# Patient Record
Sex: FEMALE | Race: WHITE | NOT HISPANIC OR LATINO | Employment: FULL TIME | URBAN - NONMETROPOLITAN AREA
[De-identification: names, ages, dates, MRNs, and addresses within clinical notes are randomized per-mention and may not be internally consistent; named-entity substitution may affect disease eponyms.]

---

## 2022-12-01 ENCOUNTER — HOSPITAL ENCOUNTER (EMERGENCY)
Facility: HOSPITAL | Age: 47
Discharge: HOME | End: 2022-12-02
Attending: EMERGENCY MEDICINE

## 2022-12-01 VITALS
TEMPERATURE: 98.7 F | DIASTOLIC BLOOD PRESSURE: 84 MMHG | RESPIRATION RATE: 20 BRPM | OXYGEN SATURATION: 96 % | WEIGHT: 210.98 LBS | HEIGHT: 62 IN | HEART RATE: 108 BPM | BODY MASS INDEX: 38.83 KG/M2 | SYSTOLIC BLOOD PRESSURE: 131 MMHG

## 2022-12-01 DIAGNOSIS — U07.1 COVID: Primary | ICD-10-CM

## 2022-12-01 NOTE — Clinical Note
Shane Stevenson was seen and treated in our emergency department on 12/1/2022     ?    ?    ? Diagnosis: ?    Ayleen Macario  may return to work on return date  She may return on this date: 12/06/2022    ? If you have any questions or concerns, please don't hesitate to call        Hannah Marquez MD    ______________________________           _______________          _______________  Hospital Representative                              Date                                Time

## 2022-12-02 ENCOUNTER — APPOINTMENT (EMERGENCY)
Dept: CT IMAGING | Facility: HOSPITAL | Age: 47
End: 2022-12-02

## 2022-12-02 ENCOUNTER — APPOINTMENT (EMERGENCY)
Dept: RADIOLOGY | Facility: HOSPITAL | Age: 47
End: 2022-12-02

## 2022-12-02 LAB
ANION GAP SERPL CALCULATED.3IONS-SCNC: 10 MMOL/L (ref 4–13)
ATRIAL RATE: 104 BPM
BASOPHILS # BLD AUTO: 0.03 THOUSANDS/ÂΜL (ref 0–0.1)
BASOPHILS NFR BLD AUTO: 0 % (ref 0–1)
BUN SERPL-MCNC: 12 MG/DL (ref 5–25)
CALCIUM SERPL-MCNC: 8.6 MG/DL (ref 8.3–10.1)
CARDIAC TROPONIN I PNL SERPL HS: 2 NG/L
CHLORIDE SERPL-SCNC: 101 MMOL/L (ref 96–108)
CO2 SERPL-SCNC: 27 MMOL/L (ref 21–32)
CREAT SERPL-MCNC: 0.75 MG/DL (ref 0.6–1.3)
D DIMER PPP FEU-MCNC: 0.69 UG/ML FEU
EOSINOPHIL # BLD AUTO: 0.04 THOUSAND/ÂΜL (ref 0–0.61)
EOSINOPHIL NFR BLD AUTO: 1 % (ref 0–6)
ERYTHROCYTE [DISTWIDTH] IN BLOOD BY AUTOMATED COUNT: 14.7 % (ref 11.6–15.1)
FLUAV RNA RESP QL NAA+PROBE: NEGATIVE
FLUBV RNA RESP QL NAA+PROBE: NEGATIVE
GFR SERPL CREATININE-BSD FRML MDRD: 94 ML/MIN/1.73SQ M
GLUCOSE SERPL-MCNC: 127 MG/DL (ref 65–140)
HCT VFR BLD AUTO: 38.6 % (ref 34.8–46.1)
HGB BLD-MCNC: 12.5 G/DL (ref 11.5–15.4)
IMM GRANULOCYTES # BLD AUTO: 0.02 THOUSAND/UL (ref 0–0.2)
IMM GRANULOCYTES NFR BLD AUTO: 0 % (ref 0–2)
LYMPHOCYTES # BLD AUTO: 1.34 THOUSANDS/ÂΜL (ref 0.6–4.47)
LYMPHOCYTES NFR BLD AUTO: 17 % (ref 14–44)
MCH RBC QN AUTO: 27.2 PG (ref 26.8–34.3)
MCHC RBC AUTO-ENTMCNC: 32.4 G/DL (ref 31.4–37.4)
MCV RBC AUTO: 84 FL (ref 82–98)
MONOCYTES # BLD AUTO: 1.09 THOUSAND/ÂΜL (ref 0.17–1.22)
MONOCYTES NFR BLD AUTO: 14 % (ref 4–12)
NEUTROPHILS # BLD AUTO: 5.43 THOUSANDS/ÂΜL (ref 1.85–7.62)
NEUTS SEG NFR BLD AUTO: 68 % (ref 43–75)
NRBC BLD AUTO-RTO: 0 /100 WBCS
P AXIS: 66 DEGREES
PLATELET # BLD AUTO: 242 THOUSANDS/UL (ref 149–390)
PMV BLD AUTO: 9.4 FL (ref 8.9–12.7)
POTASSIUM SERPL-SCNC: 3.3 MMOL/L (ref 3.5–5.3)
PR INTERVAL: 158 MS
QRS AXIS: -9 DEGREES
QRSD INTERVAL: 76 MS
QT INTERVAL: 314 MS
QTC INTERVAL: 412 MS
RBC # BLD AUTO: 4.6 MILLION/UL (ref 3.81–5.12)
RSV RNA RESP QL NAA+PROBE: NEGATIVE
SARS-COV-2 RNA RESP QL NAA+PROBE: POSITIVE
SODIUM SERPL-SCNC: 138 MMOL/L (ref 135–147)
T WAVE AXIS: 7 DEGREES
VENTRICULAR RATE: 104 BPM
WBC # BLD AUTO: 7.95 THOUSAND/UL (ref 4.31–10.16)

## 2022-12-02 RX ORDER — SODIUM CHLORIDE 9 MG/ML
3 INJECTION INTRAVENOUS
Status: DISCONTINUED | OUTPATIENT
Start: 2022-12-02 | End: 2022-12-02 | Stop reason: HOSPADM

## 2022-12-02 RX ORDER — POTASSIUM CHLORIDE 20 MEQ/1
20 TABLET, EXTENDED RELEASE ORAL ONCE
Status: COMPLETED | OUTPATIENT
Start: 2022-12-02 | End: 2022-12-02

## 2022-12-02 RX ORDER — OMEPRAZOLE 40 MG/1
CAPSULE, DELAYED RELEASE ORAL
COMMUNITY
Start: 2022-10-20

## 2022-12-02 RX ORDER — AMLODIPINE BESYLATE 5 MG/1
1 TABLET ORAL DAILY
COMMUNITY

## 2022-12-02 RX ADMIN — IOHEXOL 100 ML: 350 INJECTION, SOLUTION INTRAVENOUS at 03:11

## 2022-12-02 RX ADMIN — POTASSIUM CHLORIDE 20 MEQ: 1500 TABLET, EXTENDED RELEASE ORAL at 01:43

## 2022-12-02 NOTE — DISCHARGE INSTRUCTIONS
Because your COVID-19 test is positive, the best recommendation is to isolate for 5 days from others from the onset of symptoms  Isolation separates sick people with a contagious disease from people who are not sick  Quarantine separates and restricts the movement of people who were exposed to a contagious disease to see if they become sick  -------------------------------------------------------------  IF your symptoms are improving,  You may end isolation after day 5 if:  You are fever-free for 24 hours (without the use of fever-reducing medication)  IF your symptoms are not improving,  Continue to isolate until:  You are fever-free for 24 hours (without the use of fever-reducing medication)  Your symptoms are improving   -------------------------------------------------------------  IF you had symptoms and had: Moderate illness (you experienced shortness of breath or had difficulty breathing)  - You need to isolate through day 10  Severe illness (you were hospitalized) or have a weakened immune system  You need to isolate through day 10  Consult your doctor before ending isolation  Ending isolation without a viral test may not be an option for you   --------------------------------------------------------------    Regardless of when you end isolation    Until at least day 11:  Avoid being around people who are more likely to get very sick from COVID-19  Remember to wear a high-quality mask when indoors around others at home and in public  Do not go places where you are unable to wear a mask until you are able to discontinue masking (see below)  After you have ended isolation, when you are feeling better (no fever without the use of fever-reducing medications and symptoms improving),  Wear your mask through day 10  OR  If you have access to antigen tests, you should consider using them   With two sequential negative tests 48 hours apart, you may remove your mask sooner than day How Severe Is Your Skin Lesion?: mild 10       [employer] gl    [email or web address] [facility]  org/    COVID 23 Hotlines:  [address]    [address] [facility]    Padmini Edwar [phone] Option #7 Have Your Skin Lesions Been Treated?: not been treated Is This A New Presentation, Or A Follow-Up?: Skin Lesions

## 2022-12-02 NOTE — ED PROVIDER NOTES
History  Chief Complaint   Patient presents with   • Rapid Heart Rate     C/o congestion x few days  Pt states tonight she got up to go to the bathroom and felt like her heart was beating fast       HPI  46F presenting with congestion and elevated HR  For the past few days, patient has been feeling congestion, rhinorrhea, sore throat, cough  As she went to the bathroom, she felt her heart racing  +tightness in center of chest  Decreased po intake  Reported temp of 100 3 at home  Took Tylenol and mucinex  No recent sick contacts  Denies shortness of breath  Vaccinated for COVID, no boosters  Denies smoking, or drug use  Prior to Admission Medications   Prescriptions Last Dose Informant Patient Reported? Taking? amLODIPine (NORVASC) 5 mg tablet   Yes No   Sig: Take 1 tablet by mouth daily   omeprazole (PriLOSEC) 40 MG capsule   Yes Yes      Facility-Administered Medications: None       Past Medical History:   Diagnosis Date   • GERD (gastroesophageal reflux disease)    • Hypertension        Past Surgical History:   Procedure Laterality Date   • TONSILLECTOMY         History reviewed  No pertinent family history  I have reviewed and agree with the history as documented  E-Cigarette/Vaping   • E-Cigarette Use Never User      E-Cigarette/Vaping Substances     Social History     Tobacco Use   • Smoking status: Never   • Smokeless tobacco: Never   Vaping Use   • Vaping Use: Never used   Substance Use Topics   • Alcohol use: Not Currently   • Drug use: Never       Review of Systems   Constitutional: Negative for chills and fever  HENT: Positive for congestion and rhinorrhea  Negative for ear pain and sore throat  Eyes: Negative for pain and visual disturbance  Respiratory: Positive for cough and chest tightness  Negative for shortness of breath  Cardiovascular: Positive for chest pain and palpitations  Gastrointestinal: Negative for abdominal pain and vomiting     Genitourinary: Negative for dysuria and hematuria  Musculoskeletal: Negative for arthralgias and back pain  Skin: Negative for color change and rash  Neurological: Negative for seizures and syncope  All other systems reviewed and are negative  Physical Exam  Physical Exam  Vitals and nursing note reviewed  Constitutional:       General: She is not in acute distress  Appearance: She is well-developed  HENT:      Head: Normocephalic and atraumatic  Right Ear: External ear normal       Left Ear: External ear normal       Nose: Nose normal    Eyes:      Conjunctiva/sclera: Conjunctivae normal    Cardiovascular:      Rate and Rhythm: Normal rate and regular rhythm  Pulmonary:      Effort: Pulmonary effort is normal  No respiratory distress  Breath sounds: Normal breath sounds  Abdominal:      Palpations: Abdomen is soft  Tenderness: There is no abdominal tenderness  Musculoskeletal:         General: Tenderness present  Cervical back: Normal range of motion and neck supple  Comments: Tenderness to palpation over left chest wall  Chest tightness reproducible  Skin:     General: Skin is warm and dry  Neurological:      General: No focal deficit present  Mental Status: She is alert  Mental status is at baseline           Vital Signs  ED Triage Vitals [12/01/22 2357]   Temperature Pulse Respirations Blood Pressure SpO2   98 7 °F (37 1 °C) (!) 108 20 131/84 96 %      Temp Source Heart Rate Source Patient Position - Orthostatic VS BP Location FiO2 (%)   Temporal Monitor Sitting Right arm --      Pain Score       No Pain           Vitals:    12/01/22 2357   BP: 131/84   Pulse: (!) 108   Patient Position - Orthostatic VS: Sitting     Visual Acuity    ED Medications  Medications   sodium chloride (PF) 0 9 % injection 3 mL (has no administration in time range)   potassium chloride (K-DUR,KLOR-CON) CR tablet 20 mEq (20 mEq Oral Given 12/2/22 0143)   iohexol (OMNIPAQUE) 350 MG/ML injection (SINGLE-DOSE) 100 mL (100 mL Intravenous Given 12/2/22 0311)     Diagnostic Studies  Results Reviewed     Procedure Component Value Units Date/Time    D-dimer, quantitative [[id number]]  (Abnormal) Collected: 12/02/22 0034    Lab Status: Final result Specimen: Blood from Arm, Left Updated: 12/02/22 0141     D-Dimer, Quant 0 69 ug/ml FEU     FLU/RSV/COVID - if FLU/RSV clinically relevant [[id number]]  (Abnormal) Collected: 12/02/22 0034    Lab Status: Final result Specimen: Nares from Nose Updated: 12/02/22 0137     SARS-CoV-2 Positive     INFLUENZA A PCR Negative     INFLUENZA B PCR Negative     RSV PCR Negative    Narrative:      FOR PEDIATRIC PATIENTS - copy/paste COVID Guidelines URL to browser: [email or web address]  [employer]    SARS-CoV-2 assay is a Nucleic Acid Amplification assay intended for the  qualitative detection of nucleic acid from SARS-CoV-2 in nasopharyngeal  swabs  Results are for the presumptive identification of SARS-CoV-2 RNA  Positive results are indicative of infection with SARS-CoV-2, the virus  causing COVID-19, but do not rule out bacterial infection or co-infection  with other viruses  Laboratories within the [place] and its  territories are required to report all positive results to the appropriate  public health authorities  Negative results do not preclude SARS-CoV-2  infection and should not be used as the sole basis for treatment or other  patient management decisions  Negative results must be combined with  clinical observations, patient history, and epidemiological information  This test has not been FDA cleared or approved  This test has been authorized by FDA under an Emergency Use Authorization  (EUA)   This test is only authorized for the duration of time the  declaration that circumstances exist justifying the authorization of the  emergency use of an in vitro diagnostic tests for detection of SARS-CoV-2  virus and/or diagnosis of COVID-19 infection under section 564(b)(1) of  the Act, 21 U  S C  775AXA-8(Z)(4), unless the authorization is terminated  or revoked sooner  The test has been validated but independent review by FDA  and CLIA is pending  Test performed using [employer] GeneXpert: This RT-PCR assay targets N2,  a region unique to SARS-CoV-2  A conserved region in the E-gene was chosen  for pan-Sarbecovirus detection which includes SARS-CoV-2  According to CMS-2020-01-R, this platform meets the definition of high-throughput technology      HS Troponin 0hr (reflex protocol) [[id number]]  (Normal) Collected: 12/02/22 0034    Lab Status: Final result Specimen: Blood from Arm, Left Updated: 12/02/22 0124     hs TnI 0hr 2 ng/L     Basic metabolic panel [[id number]]  (Abnormal) Collected: 12/02/22 0034    Lab Status: Final result Specimen: Blood from Arm, Left Updated: 12/02/22 0111     Sodium 138 mmol/L      Potassium 3 3 mmol/L      Chloride 101 mmol/L      CO2 27 mmol/L      ANION GAP 10 mmol/L      BUN 12 mg/dL      Creatinine 0 75 mg/dL      Glucose 127 mg/dL      Calcium 8 6 mg/dL      eGFR 94 ml/min/1 73sq m     Narrative:      [place] guidelines for Chronic Kidney Disease (CKD):   •  Stage 1 with normal or high GFR (GFR > 90 mL/min/1 73 square meters)  •  Stage 2 Mild CKD (GFR = 60-89 mL/min/1 73 square meters)  •  Stage 3A Moderate CKD (GFR = 45-59 mL/min/1 73 square meters)  •  Stage 3B Moderate CKD (GFR = 30-44 mL/min/1 73 square meters)  •  Stage 4 Severe CKD (GFR = 15-29 mL/min/1 73 square meters)  •  Stage 5 End Stage CKD (GFR <15 mL/min/1 73 square meters)  Note: GFR calculation is accurate only with a steady state creatinine    CBC and differential [[id number]]  (Abnormal) Collected: 12/02/22 0034    Lab Status: Final result Specimen: Blood from Arm, Left Updated: 12/02/22 0059     WBC 7 95 Thousand/uL      RBC 4 60 Million/uL      Hemoglobin 12 5 g/dL      Hematocrit 38 6 %      MCV 84 fL      MCH 27 2 pg MCHC 32 4 g/dL      RDW 14 7 %      MPV 9 4 fL      Platelets 514 Thousands/uL      nRBC 0 /100 WBCs      Neutrophils Relative 68 %      Immat GRANS % 0 %      Lymphocytes Relative 17 %      Monocytes Relative 14 %      Eosinophils Relative 1 %      Basophils Relative 0 %      Neutrophils Absolute 5 43 Thousands/µL      Immature Grans Absolute 0 02 Thousand/uL      Lymphocytes Absolute 1 34 Thousands/µL      Monocytes Absolute 1 09 Thousand/µL      Eosinophils Absolute 0 04 Thousand/µL      Basophils Absolute 0 03 Thousands/µL             CTA ED chest PE study   Final Result by Juan Antonio Mcclain MD (12/02 9568)      No evidence of pulmonary embolus  No consolidation or effusion  Workstation performed: [id number]         X-ray chest 1 view portable   ED Interpretation by Aria Mcallister MD (12/02 0036)   No acute cardiopulm abnl             Procedures  ECG 12 Lead Documentation Only    Date/Time: 12/2/2022 12:10 AM  Performed by: Aria Mcallister MD  Authorized by: Aria Mcallister MD     Patient location:  ED  Previous ECG:     Previous ECG:  Compared to current    Similarity:  No change  Interpretation:     Interpretation: normal    Rate:     ECG rate:  104    ECG rate assessment: tachycardic    Rhythm:     Rhythm: sinus rhythm    Ectopy:     Ectopy: none    QRS:     QRS axis:  Normal  Conduction:     Conduction: normal    ST segments:     ST segments:  Normal  T waves:     T waves: normal           ED Course  ED Course as of 12/02/22 0516   Fri Dec 02, 2022   0107 WBC: 7 95   0107 Hemoglobin: 12 5   0452 CT PE  IMPRESSION:  No evidence of pulmonary embolus  No consolidation or effusion  MDM   46F presenting with URI symptoms  EKG w/o acute ischemic changes  CBC wnl  BMP significant for hypokalemia 3 3 and repleted with po KCl  Troponin wnl at 2  CXR per my interpretation is w/o acute cardiopulm abnl  Given elevated D-dimer, CT PE was obtained  CT negative for PE or consolidations/effusion   Patient tested positive for COVID which explains all of her symptoms  She was advised on quarantining  Patient felt well enough to go home  Discharged in stable condition  Disposition  Final diagnoses:   COVID     Time reflects when diagnosis was documented in both MDM as applicable and the Disposition within this note     Time User Action Codes Description Comment    12/2/2022  3:19 AM Davey Post Add [U07 1] Damaris       ED Disposition     ED Disposition   Discharge    Condition   Stable    Date/Time   Fri Dec 2, 2022  3:19 AM    Comment   Trudi A Kemmerling discharge to home/self care  Follow-up Information    None         Patient's Medications   Discharge Prescriptions    No medications on file       No discharge procedures on file      PDMP Review     None          ED Provider  Electronically Signed by           Obdulio Ward MD  12/02/22 5771

## 2023-03-26 ENCOUNTER — HOSPITAL ENCOUNTER (EMERGENCY)
Facility: HOSPITAL | Age: 48
Discharge: HOME | End: 2023-03-26
Attending: EMERGENCY MEDICINE

## 2023-03-26 ENCOUNTER — APPOINTMENT (EMERGENCY)
Dept: CT IMAGING | Facility: HOSPITAL | Age: 48
End: 2023-03-26

## 2023-03-26 VITALS
OXYGEN SATURATION: 97 % | RESPIRATION RATE: 18 BRPM | SYSTOLIC BLOOD PRESSURE: 135 MMHG | HEART RATE: 76 BPM | TEMPERATURE: 96.8 F | DIASTOLIC BLOOD PRESSURE: 78 MMHG

## 2023-03-26 DIAGNOSIS — R07.89 ATYPICAL CHEST PAIN: Primary | ICD-10-CM

## 2023-03-26 DIAGNOSIS — R10.13 EPIGASTRIC PAIN: ICD-10-CM

## 2023-03-26 LAB
ALBUMIN SERPL BCP-MCNC: 3.9 G/DL (ref 3.5–5)
ALP SERPL-CCNC: 69 U/L (ref 34–104)
ALT SERPL W P-5'-P-CCNC: 14 U/L (ref 7–52)
ANION GAP SERPL CALCULATED.3IONS-SCNC: 8 MMOL/L (ref 4–13)
APTT PPP: 28 SECONDS (ref 23–37)
AST SERPL W P-5'-P-CCNC: 13 U/L (ref 13–39)
BASOPHILS # BLD AUTO: 0.02 THOUSANDS/ÂΜL (ref 0–0.1)
BASOPHILS NFR BLD AUTO: 0 % (ref 0–1)
BILIRUB SERPL-MCNC: 0.23 MG/DL (ref 0.2–1)
BUN SERPL-MCNC: 20 MG/DL (ref 5–25)
CALCIUM SERPL-MCNC: 8.7 MG/DL (ref 8.4–10.2)
CARDIAC TROPONIN I PNL SERPL HS: <2 NG/L
CARDIAC TROPONIN I PNL SERPL HS: <2 NG/L
CHLORIDE SERPL-SCNC: 104 MMOL/L (ref 96–108)
CO2 SERPL-SCNC: 24 MMOL/L (ref 21–32)
CREAT SERPL-MCNC: 0.87 MG/DL (ref 0.6–1.3)
EOSINOPHIL # BLD AUTO: 0.12 THOUSAND/ÂΜL (ref 0–0.61)
EOSINOPHIL NFR BLD AUTO: 2 % (ref 0–6)
ERYTHROCYTE [DISTWIDTH] IN BLOOD BY AUTOMATED COUNT: 14.6 % (ref 11.6–15.1)
GFR SERPL CREATININE-BSD FRML MDRD: 78 ML/MIN/1.73SQ M
GLUCOSE SERPL-MCNC: 101 MG/DL (ref 65–140)
HCT VFR BLD AUTO: 37.9 % (ref 34.8–46.1)
HGB BLD-MCNC: 12 G/DL (ref 11.5–15.4)
IMM GRANULOCYTES # BLD AUTO: 0.02 THOUSAND/UL (ref 0–0.2)
IMM GRANULOCYTES NFR BLD AUTO: 0 % (ref 0–2)
INR PPP: 0.91 (ref 0.84–1.19)
LACTATE SERPL-SCNC: 1.1 MMOL/L (ref 0.5–2)
LIPASE SERPL-CCNC: 42 U/L (ref 11–82)
LYMPHOCYTES # BLD AUTO: 2.41 THOUSANDS/ÂΜL (ref 0.6–4.47)
LYMPHOCYTES NFR BLD AUTO: 31 % (ref 14–44)
MCH RBC QN AUTO: 27 PG (ref 26.8–34.3)
MCHC RBC AUTO-ENTMCNC: 31.7 G/DL (ref 31.4–37.4)
MCV RBC AUTO: 85 FL (ref 82–98)
MONOCYTES # BLD AUTO: 0.76 THOUSAND/ÂΜL (ref 0.17–1.22)
MONOCYTES NFR BLD AUTO: 10 % (ref 4–12)
NEUTROPHILS # BLD AUTO: 4.42 THOUSANDS/ÂΜL (ref 1.85–7.62)
NEUTS SEG NFR BLD AUTO: 57 % (ref 43–75)
NRBC BLD AUTO-RTO: 0 /100 WBCS
PLATELET # BLD AUTO: 280 THOUSANDS/UL (ref 149–390)
PMV BLD AUTO: 9.6 FL (ref 8.9–12.7)
POTASSIUM SERPL-SCNC: 4 MMOL/L (ref 3.5–5.3)
PROT SERPL-MCNC: 6.4 G/DL (ref 6.4–8.4)
PROTHROMBIN TIME: 12.3 SECONDS (ref 11.6–14.5)
RBC # BLD AUTO: 4.44 MILLION/UL (ref 3.81–5.12)
SODIUM SERPL-SCNC: 136 MMOL/L (ref 135–147)
WBC # BLD AUTO: 7.75 THOUSAND/UL (ref 4.31–10.16)

## 2023-03-26 RX ORDER — LIDOCAINE HYDROCHLORIDE 20 MG/ML
15 SOLUTION OROPHARYNGEAL ONCE
Status: COMPLETED | OUTPATIENT
Start: 2023-03-26 | End: 2023-03-26

## 2023-03-26 RX ORDER — SULFAMETHOXAZOLE AND TRIMETHOPRIM 800; 160 MG/1; MG/1
1 TABLET ORAL EVERY 12 HOURS SCHEDULED
COMMUNITY

## 2023-03-26 RX ORDER — MAGNESIUM HYDROXIDE/ALUMINUM HYDROXICE/SIMETHICONE 120; 1200; 1200 MG/30ML; MG/30ML; MG/30ML
30 SUSPENSION ORAL ONCE
Status: COMPLETED | OUTPATIENT
Start: 2023-03-26 | End: 2023-03-26

## 2023-03-26 RX ADMIN — ALUMINUM HYDROXIDE, MAGNESIUM HYDROXIDE, AND DIMETHICONE 30 ML: 200; 20; 200 SUSPENSION ORAL at 20:41

## 2023-03-26 RX ADMIN — LIDOCAINE HYDROCHLORIDE 15 ML: 20 SOLUTION ORAL at 20:41

## 2023-03-26 RX ADMIN — IOHEXOL 85 ML: 350 INJECTION, SOLUTION INTRAVENOUS at 22:21

## 2023-03-27 LAB
ATRIAL RATE: 86 BPM
P AXIS: 51 DEGREES
PR INTERVAL: 164 MS
QRS AXIS: -8 DEGREES
QRSD INTERVAL: 90 MS
QT INTERVAL: 370 MS
QTC INTERVAL: 442 MS
T WAVE AXIS: 28 DEGREES
VENTRICULAR RATE: 86 BPM

## 2023-03-27 NOTE — ED PROVIDER NOTES
History  Chief Complaint   Patient presents with   • Chest Pain     Epigastric pain- started around 1900  Pt took Tums with no relief  Patient complains of epigastric pain that goes into her chest   Was slightly diaphoretic  No nausea  No shortness of breath  Took Tums without any relief  Getting slightly better  Occurred approximately 90 minutes prior to arrival   Does have a history of reflux but states this feels different  No history of gallbladder disease or peptic ulcer disease  No recent cough or cold symptoms  History provided by:  Patient   used: No    Chest Pain  Pain location:  Epigastric  Pain quality: aching    Pain radiates to:  Does not radiate  Pain radiates to the back: no    Pain severity:  Mild  Onset quality:  Sudden  Duration:  90 minutes  Timing:  Constant  Chronicity:  New  Context: at rest    Context: not breathing, no drug use and no movement    Relieved by:  Nothing  Worsened by:  Nothing tried  Ineffective treatments:  Antacids  Associated symptoms: abdominal pain and diaphoresis    Associated symptoms: no altered mental status, no anxiety, no cough, no dizziness, no dysphagia, no fever, no headache, no nausea, no orthopnea, no palpitations, no PND, no shortness of breath, no syncope and not vomiting        Prior to Admission Medications   Prescriptions Last Dose Informant Patient Reported? Taking? amLODIPine (NORVASC) 5 mg tablet   Yes No   Sig: Take 1 tablet by mouth daily   omeprazole (PriLOSEC) 40 MG capsule   Yes No   sulfamethoxazole-trimethoprim (BACTRIM DS) 800-160 mg per tablet   Yes Yes   Sig: Take 1 tablet by mouth every 12 (twelve) hours      Facility-Administered Medications: None       Past Medical History:   Diagnosis Date   • GERD (gastroesophageal reflux disease)    • Hypertension        Past Surgical History:   Procedure Laterality Date   • TONSILLECTOMY         History reviewed  No pertinent family history    I have reviewed and agree with the history as documented  E-Cigarette/Vaping   • E-Cigarette Use Never User      E-Cigarette/Vaping Substances     Social History     Tobacco Use   • Smoking status: Never   • Smokeless tobacco: Never   Vaping Use   • Vaping Use: Never used   Substance Use Topics   • Alcohol use: Not Currently   • Drug use: Never       Review of Systems   Constitutional: Positive for diaphoresis  Negative for chills and fever  HENT: Negative for ear pain, hearing loss, sore throat, trouble swallowing and voice change  Eyes: Negative for pain and discharge  Respiratory: Negative for cough, shortness of breath and wheezing  Cardiovascular: Positive for chest pain  Negative for palpitations, orthopnea, syncope and PND  Gastrointestinal: Positive for abdominal pain  Negative for blood in stool, constipation, diarrhea, nausea and vomiting  Genitourinary: Negative for dysuria, flank pain, frequency and hematuria  Musculoskeletal: Negative for joint swelling, neck pain and neck stiffness  Skin: Negative for rash and wound  Neurological: Negative for dizziness, seizures, syncope, facial asymmetry and headaches  Psychiatric/Behavioral: Negative for hallucinations, self-injury and suicidal ideas  All other systems reviewed and are negative  Physical Exam  Physical Exam  Vitals and nursing note reviewed  Constitutional:       General: She is not in acute distress  Appearance: She is well-developed  HENT:      Head: Normocephalic and atraumatic  Right Ear: External ear normal       Left Ear: External ear normal    Eyes:      General: No scleral icterus  Right eye: No discharge  Left eye: No discharge  Extraocular Movements: Extraocular movements intact  Conjunctiva/sclera: Conjunctivae normal    Cardiovascular:      Rate and Rhythm: Normal rate and regular rhythm  Heart sounds: Normal heart sounds  No murmur heard    Pulmonary:      Effort: Pulmonary effort is normal       Breath sounds: Normal breath sounds  No wheezing or rales  Abdominal:      General: Bowel sounds are normal  There is no distension  Palpations: Abdomen is soft  Tenderness: There is abdominal tenderness  There is no guarding or rebound  Comments: Mild tenderness in the epigastric region  Musculoskeletal:         General: No deformity  Normal range of motion  Cervical back: Normal range of motion and neck supple  Skin:     General: Skin is warm and dry  Findings: No rash  Neurological:      General: No focal deficit present  Mental Status: She is alert and oriented to person, place, and time  Cranial Nerves: No cranial nerve deficit  Psychiatric:         Mood and Affect: Mood normal          Behavior: Behavior normal          Thought Content:  Thought content normal          Judgment: Judgment normal          Vital Signs  ED Triage Vitals   Temperature Pulse Respirations Blood Pressure SpO2   03/26/23 2029 03/26/23 2020 03/26/23 2020 03/26/23 2020 03/26/23 2020   (!) 96 8 °F (36 °C) 93 18 160/96 97 %      Temp Source Heart Rate Source Patient Position - Orthostatic VS BP Location FiO2 (%)   03/26/23 2029 -- 03/26/23 2020 03/26/23 2020 --   Oral  Lying Right arm       Pain Score       --                  Vitals:    03/26/23 2020 03/26/23 2115   BP: 160/96 120/67   Pulse: 93 77   Patient Position - Orthostatic VS: Lying          Visual Acuity      ED Medications  Medications   aluminum-magnesium hydroxide-simethicone (MYLANTA) oral suspension 30 mL (30 mL Oral Given 3/26/23 2041)   Lidocaine Viscous HCl (XYLOCAINE) 2 % mucosal solution 15 mL (15 mL Swish & Swallow Given 3/26/23 2041)   iohexol (OMNIPAQUE) 350 MG/ML injection (SINGLE-DOSE) 85 mL (85 mL Intravenous Given 3/26/23 2221)       Diagnostic Studies  Results Reviewed     Procedure Component Value Units Date/Time    HS Troponin I 2hr [[id number]] Collected: 03/26/23 2142    Lab Status: Final result Specimen: Blood from Arm, Left Updated: 03/26/23 2225     hs TnI 2hr <2 ng/L      Delta 2hr hsTnI --    Lactic acid [[id number]]  (Normal) Collected: 03/26/23 2030    Lab Status: Final result Specimen: Blood from Arm, Left Updated: 03/26/23 2146     LACTIC ACID 1 1 mmol/L     Narrative:      Result may be elevated if tourniquet was used during collection      HS Troponin 0hr (reflex protocol) [[id number]]  (Normal) Collected: 03/26/23 2030    Lab Status: Final result Specimen: Blood from Arm, Left Updated: 03/26/23 2138     hs TnI 0hr <2 ng/L     Comprehensive metabolic panel [[id number]] Collected: 03/26/23 2030    Lab Status: Final result Specimen: Blood from Arm, Left Updated: 03/26/23 2136     Sodium 136 mmol/L      Potassium 4 0 mmol/L      Chloride 104 mmol/L      CO2 24 mmol/L      ANION GAP 8 mmol/L      BUN 20 mg/dL      Creatinine 0 87 mg/dL      Glucose 101 mg/dL      Calcium 8 7 mg/dL      AST 13 U/L      ALT 14 U/L      Alkaline Phosphatase 69 U/L      Total Protein 6 4 g/dL      Albumin 3 9 g/dL      Total Bilirubin 0 23 mg/dL      eGFR 78 ml/min/1 73sq m     Narrative:      [place] guidelines for Chronic Kidney Disease (CKD):   •  Stage 1 with normal or high GFR (GFR > 90 mL/min/1 73 square meters)  •  Stage 2 Mild CKD (GFR = 60-89 mL/min/1 73 square meters)  •  Stage 3A Moderate CKD (GFR = 45-59 mL/min/1 73 square meters)  •  Stage 3B Moderate CKD (GFR = 30-44 mL/min/1 73 square meters)  •  Stage 4 Severe CKD (GFR = 15-29 mL/min/1 73 square meters)  •  Stage 5 End Stage CKD (GFR <15 mL/min/1 73 square meters)  Note: GFR calculation is accurate only with a steady state creatinine    Lipase [[id number]]  (Normal) Collected: 03/26/23 2030    Lab Status: Final result Specimen: Blood from Arm, Left Updated: 03/26/23 2136     Lipase 42 u/L     Protime-INR [[id number]]  (Normal) Collected: 03/26/23 2030    Lab Status: Final result Specimen: Blood from Arm, Left Updated: 03/26/23 1905 Protime 12 3 seconds      INR 0 91    APTT [[id number]]  (Normal) Collected: 03/26/23 2030    Lab Status: Final result Specimen: Blood from Arm, Left Updated: 03/26/23 2129     PTT 28 seconds     CBC and differential [[id number]] Collected: 03/26/23 2030    Lab Status: Final result Specimen: Blood from Arm, Left Updated: 03/26/23 2108     WBC 7 75 Thousand/uL      RBC 4 44 Million/uL      Hemoglobin 12 0 g/dL      Hematocrit 37 9 %      MCV 85 fL      MCH 27 0 pg      MCHC 31 7 g/dL      RDW 14 6 %      MPV 9 6 fL      Platelets 273 Thousands/uL      nRBC 0 /100 WBCs      Neutrophils Relative 57 %      Immat GRANS % 0 %      Lymphocytes Relative 31 %      Monocytes Relative 10 %      Eosinophils Relative 2 %      Basophils Relative 0 %      Neutrophils Absolute 4 42 Thousands/µL      Immature Grans Absolute 0 02 Thousand/uL      Lymphocytes Absolute 2 41 Thousands/µL      Monocytes Absolute 0 76 Thousand/µL      Eosinophils Absolute 0 12 Thousand/µL      Basophils Absolute 0 02 Thousands/µL                  PE Study with CT Abdomen and Pelvis with contrast   Final Result by Ifeoma Bush MD (03/26 2311)      1  No pulmonary embolism   2  No acute abdominopelvic abnormality   3  Diverticulosis without diverticulitis                     Workstation performed: [id number]                    Procedures  ECG 12 Lead Documentation Only    Date/Time: 3/26/2023 8:26 PM  Performed by: Aaron Luz MD  Authorized by: Aaron Luz MD     ECG reviewed by me, the ED Provider: yes    Patient location:  ED  Previous ECG:     Previous ECG:  Compared to current    Similarity:  No change  Rate:     ECG rate:  90  Rhythm:     Rhythm: sinus rhythm    Ectopy:     Ectopy: none    QRS:     QRS axis:  Normal             ED Course  ED Course as of 03/26/23 2318   [place] Mar 26, 2023   2228 Patient with a low heart score  Feeling better after GI cocktail  Both troponins less than 2  Doubt cardiac etiology               HEART Risk Score    Flowsheet Row Most Recent Value   Heart Score Risk Calculator    History 0 Filed at: 03/26/2023 2027   ECG 0 Filed at: 03/26/2023 2027   Age 1 Filed at: 03/26/2023 2027   Risk Factors 2 Filed at: 03/26/2023 2027   Troponin 0 Filed at: 03/26/2023 2027   HEART Score 3 Filed at: 03/26/2023 2027                        SBIRT 20yo+    Flowsheet Row Most Recent Value   SBIRT (25 yo +)    In order to provide better care to our patients, we are screening all of our patients for alcohol and drug use  Would it be okay to ask you these screening questions? Yes Filed at: 03/26/2023 2137   Initial Alcohol Screen: US AUDIT-C     1  How often do you have a drink containing alcohol? 0 Filed at: 03/26/2023 2137   2  How many drinks containing alcohol do you have on a typical day you are drinking? 0 Filed at: 03/26/2023 2137   3a  Male UNDER 65: How often do you have five or more drinks on one occasion? 0 Filed at: 03/26/2023 2137   3b  FEMALE Any Age, or MALE 65+: How often do you have 4 or more drinks on one occassion? 0 Filed at: 03/26/2023 2137   Audit-C Score 0 Filed at: 03/26/2023 2137   ALMA: How many times in the past year have you    Used an illegal drug or used a prescription medication for non-medical reasons? Never Filed at: 03/26/2023 2137                    Medical Decision Making  Amount and/or Complexity of Data Reviewed  Independent Historian: EMS  External Data Reviewed: labs, radiology, ECG and notes  Labs: ordered  Decision-making details documented in ED Course  Radiology: ordered  Decision-making details documented in ED Course  ECG/medicine tests: ordered and independent interpretation performed  Decision-making details documented in ED Course  Risk  OTC drugs  Prescription drug management  Decision regarding hospitalization            Disposition  Final diagnoses:   Atypical chest pain   Epigastric pain     Time reflects when diagnosis was documented in both MDM as applicable and the Disposition within this note     Time User Action Codes Description Comment    3/26/2023 10:34 PM Fabiola Zhang Add [R07 89] Atypical chest pain     3/26/2023 11:18 PM Fabiola Zhang Add [R10 13] Epigastric pain       ED Disposition     ED Disposition   Discharge    Condition   Stable    Date/Time   Sun Mar 26, 2023 11:18 PM    Comment   Trudi A Kemmerling discharge to home/self care  Follow-up Information     Follow up With Specialties Details Why Contact Info    Antonio Arriaza DO Internal Medicine, Pediatrics Call in 1 day  [address] [facility] [id number] [phone]            Patient's Medications   Discharge Prescriptions    No medications on file       No discharge procedures on file      PDMP Review     None          ED Provider  Electronically Signed by           Leatha Landeros MD  03/26/23 0316

## 2023-12-11 LAB
CLINICAL INFO: NORMAL
CYTO CVX: NORMAL
CYTOLOGY CMNT CVX/VAG CYTO-IMP: NORMAL
DATE PREVIOUS BX: NORMAL
HPV I/H RISK 1 DNA CVX QL PROBE+SIG AMP: NOT DETECTED
LMP START DATE: NORMAL
SL AMB PREV. PAP:: NORMAL
SPECIMEN SOURCE CVX/VAG CYTO: NORMAL

## 2024-08-09 ENCOUNTER — APPOINTMENT (EMERGENCY)
Dept: RADIOLOGY | Facility: HOSPITAL | Age: 49
End: 2024-08-09
Payer: COMMERCIAL

## 2024-08-09 ENCOUNTER — HOSPITAL ENCOUNTER (EMERGENCY)
Facility: HOSPITAL | Age: 49
Discharge: HOME | End: 2024-08-09
Attending: EMERGENCY MEDICINE
Payer: COMMERCIAL

## 2024-08-09 VITALS
DIASTOLIC BLOOD PRESSURE: 70 MMHG | HEART RATE: 92 BPM | OXYGEN SATURATION: 98 % | TEMPERATURE: 99.1 F | RESPIRATION RATE: 16 BRPM | BODY MASS INDEX: 39.32 KG/M2 | WEIGHT: 215 LBS | SYSTOLIC BLOOD PRESSURE: 134 MMHG

## 2024-08-09 DIAGNOSIS — M65.20 CALCIFIC TENDINITIS: Primary | ICD-10-CM

## 2024-08-09 LAB
ATRIAL RATE: 87 BPM
P AXIS: 38 DEGREES
PR INTERVAL: 168 MS
QRS AXIS: -20 DEGREES
QRSD INTERVAL: 82 MS
QT INTERVAL: 346 MS
QTC INTERVAL: 416 MS
T WAVE AXIS: 1 DEGREES
VENTRICULAR RATE: 87 BPM

## 2024-08-09 PROCEDURE — 99283 EMERGENCY DEPT VISIT LOW MDM: CPT

## 2024-08-09 PROCEDURE — 93005 ELECTROCARDIOGRAM TRACING: CPT

## 2024-08-09 PROCEDURE — 99284 EMERGENCY DEPT VISIT MOD MDM: CPT | Performed by: PHYSICIAN ASSISTANT

## 2024-08-09 PROCEDURE — 96372 THER/PROPH/DIAG INJ SC/IM: CPT

## 2024-08-09 PROCEDURE — 73030 X-RAY EXAM OF SHOULDER: CPT

## 2024-08-09 RX ORDER — NAPROXEN 500 MG/1
500 TABLET ORAL 2 TIMES DAILY WITH MEALS
Qty: 10 TABLET | Refills: 0 | Status: SHIPPED | OUTPATIENT
Start: 2024-08-09 | End: 2024-08-13

## 2024-08-09 RX ORDER — KETOROLAC TROMETHAMINE 30 MG/ML
15 INJECTION, SOLUTION INTRAMUSCULAR; INTRAVENOUS ONCE
Status: COMPLETED | OUTPATIENT
Start: 2024-08-09 | End: 2024-08-09

## 2024-08-09 RX ORDER — LIDOCAINE 50 MG/G
1 PATCH TOPICAL ONCE
Status: DISCONTINUED | OUTPATIENT
Start: 2024-08-09 | End: 2024-08-09 | Stop reason: HOSPADM

## 2024-08-09 RX ORDER — LIDOCAINE 50 MG/G
1 PATCH TOPICAL DAILY
Qty: 6 PATCH | Refills: 0 | Status: SHIPPED | OUTPATIENT
Start: 2024-08-09

## 2024-08-09 RX ADMIN — KETOROLAC TROMETHAMINE 15 MG: 30 INJECTION, SOLUTION INTRAMUSCULAR; INTRAVENOUS at 10:47

## 2024-08-09 RX ADMIN — LIDOCAINE 1 PATCH: 50 PATCH CUTANEOUS at 10:49

## 2024-08-09 NOTE — ED PROVIDER NOTES
History  Chief Complaint   Patient presents with    Shoulder Pain     Pt presents to the ED reporting left shoulder pain since Wednesday. Denies known injury.      48-year-old female presents to the emergency department for evaluation of left shoulder pain onset of symptoms Wednesday.  Patient states she did not have any trauma or injury.  States she has decreased range of motion at the shoulder.  Denies any swelling or redness fevers or chills.  States she had similar episode of shoulder pain several months ago which resolved on its own.  States this does not seem to be getting better.  Did buy herself a sling last night which she has been using for comfort.        Prior to Admission Medications   Prescriptions Last Dose Informant Patient Reported? Taking?   amLODIPine (NORVASC) 5 mg tablet   Yes Yes   Sig: Take 1 tablet by mouth daily   omeprazole (PriLOSEC) 40 MG capsule   Yes Yes      Facility-Administered Medications: None       Past Medical History:   Diagnosis Date    GERD (gastroesophageal reflux disease)     Hypertension        Past Surgical History:   Procedure Laterality Date    TONSILLECTOMY         History reviewed. No pertinent family history.  I have reviewed and agree with the history as documented.    E-Cigarette/Vaping    E-Cigarette Use Never User      E-Cigarette/Vaping Substances     Social History     Tobacco Use    Smoking status: Never    Smokeless tobacco: Never   Vaping Use    Vaping status: Never Used   Substance Use Topics    Alcohol use: Not Currently    Drug use: Never       Review of Systems   Constitutional: Negative.    Respiratory: Negative.     Cardiovascular: Negative.    Musculoskeletal:  Positive for arthralgias.   Skin: Negative.    Neurological: Negative.    All other systems reviewed and are negative.      Physical Exam  Physical Exam  Vitals and nursing note reviewed.   Constitutional:       General: She is not in acute distress.     Appearance: Normal appearance. She is not  ill-appearing, toxic-appearing or diaphoretic.   HENT:      Head: Normocephalic.   Eyes:      Conjunctiva/sclera: Conjunctivae normal.   Cardiovascular:      Rate and Rhythm: Normal rate and regular rhythm.      Pulses:           Radial pulses are 2+ on the left side.   Pulmonary:      Effort: Pulmonary effort is normal.      Breath sounds: Normal breath sounds. No stridor. No wheezing, rhonchi or rales.   Chest:      Chest wall: No tenderness.   Musculoskeletal:         General: Tenderness present. No swelling.      Left shoulder: Tenderness and bony tenderness present. No swelling or deformity. Decreased range of motion. Decreased strength. Normal pulse.        Arms:    Skin:     General: Skin is warm and dry.      Findings: No bruising, erythema or rash.   Neurological:      General: No focal deficit present.      Mental Status: She is alert.   Psychiatric:         Mood and Affect: Mood normal.         Vital Signs  ED Triage Vitals   Temperature Pulse Respirations Blood Pressure SpO2   08/09/24 0959 08/09/24 0959 08/09/24 0959 08/09/24 0959 08/09/24 0959   99.1 °F (37.3 °C) 105 18 157/81 99 %      Temp Source Heart Rate Source Patient Position - Orthostatic VS BP Location FiO2 (%)   08/09/24 0959 08/09/24 0959 08/09/24 0959 08/09/24 0959 --   Temporal Monitor Sitting Right arm       Pain Score       08/09/24 1047       8           Vitals:    08/09/24 0959 08/09/24 1045 08/09/24 1115 08/09/24 1130   BP: 157/81 125/72 147/70 134/70   Pulse: 105 91 94 92   Patient Position - Orthostatic VS: Sitting            Visual Acuity      ED Medications  Medications   ketorolac (TORADOL) injection 15 mg (15 mg Intramuscular Given 8/9/24 1047)       Diagnostic Studies  Results Reviewed       None                   XR shoulder 2+ views LEFT   Final Result by Sp Mccoy MD (08/09 1112)      Evidence of significant calcific tendinopathy.      No acute fracture.      This was discussed with ALAN Dorsey at 11:09 a.m.          Computerized Assisted Algorithm (CAA) may have been used to analyze all applicable images.         Workstation performed: [id number]                    Procedures  ECG 12 Lead Documentation Only    Date/Time: 8/9/2024 10:51 AM    Performed by: Ginny Monterroso PA-C  Authorized by: Ginny Monterroso PA-C    Indications / Diagnosis:  Shoulder pain  Patient location:  ED  Interpretation:     Interpretation: non-specific    Rate:     ECG rate:  87    ECG rate assessment: normal    Rhythm:     Rhythm: sinus rhythm    Ectopy:     Ectopy: none    QRS:     QRS axis:  Normal    QRS intervals:  Normal  Conduction:     Conduction: normal             ED Course  ED Course as of 08/09/24 1703   Fri Aug 09, 2024   1110 X-ray concerning for calcific tendinopathy.  Discussed results and findings with the patient including symptomatic treatment return precautions and follow-up and she verbalized understanding.  She is clinically and hemodynamically stable for discharge                                 SBIRT 20yo+      Flowsheet Row Most Recent Value   Initial Alcohol Screen: US AUDIT-C     1. How often do you have a drink containing alcohol? 0 Filed at: 08/09/2024 1001   2. How many drinks containing alcohol do you have on a typical day you are drinking?  0 Filed at: 08/09/2024 1001   3b. FEMALE Any Age, or MALE 65+: How often do you have 4 or more drinks on one occassion? 0 Filed at: 08/09/2024 1001   Audit-C Score 0 Filed at: 08/09/2024 1001   ALMA: How many times in the past year have you...    Used an illegal drug or used a prescription medication for non-medical reasons? Never Filed at: 08/09/2024 1001                      Medical Decision Making  48-year-old female presented to the emergency department for evaluation of atraumatic left shoulder pain.  Vitals and medical records reviewed.  Patient arciform but no limited to arthritis, calcified tendinitis, strain fracture, dislocation, septic joint.  EKG was obtained due to  shoulder pain however normal sinus rhythm, lower concern that this is cardiac in nature.  Her x-ray was concerning for calcified tendinitis which is likely the cause of the patient's discomfort.  We discussed symptomatic treatment and the importance of follow-up and she verbalized understanding.  Patient was clinical and hemodynamically stable for discharge.    Amount and/or Complexity of Data Reviewed  Radiology: ordered.    Risk  Prescription drug management.                 Disposition  Final diagnoses:   Calcific tendinitis     Time reflects when diagnosis was documented in both MDM as applicable and the Disposition within this note       Time User Action Codes Description Comment    8/9/2024 11:09 AM Ginny Monterroso Add [M65.20] Calcific tendinitis           ED Disposition       ED Disposition   Discharge    Condition   Stable    Date/Time   Fri Aug 9, 2024 1108    Comment   Trudi A Kemmerling discharge to home/self care.                   Follow-up Information       Follow up With Specialties Details Why Contact Info    Zev Andres DO Internal Medicine, Pediatrics   [address] [place] [address]  [phone]      Kvng Jarrell MD Orthopedic Surgery, Sports Medicine   [facility] [address]  [phone]              Discharge Medication List as of 8/9/2024 11:10 AM        CONTINUE these medications which have NOT CHANGED    Details   amLODIPine (NORVASC) 5 mg tablet Take 1 tablet by mouth daily, Historical Med      omeprazole (PriLOSEC) 40 MG capsule Starting Thu 10/20/2022, Historical Med             No discharge procedures on file.    PDMP Review       None            ED Provider  Electronically Signed by             Ginny Monterroso PA-C  08/09/24 3710

## 2024-08-09 NOTE — DISCHARGE INSTRUCTIONS
Please follow-up with our sports medicine physician.  Please return to the emergency department with new or worsening symptoms

## 2024-08-12 RX ORDER — ERGOCALCIFEROL 1.25 MG/1
50000 CAPSULE, LIQUID FILLED ORAL WEEKLY
COMMUNITY
Start: 2024-06-03

## 2024-08-12 RX ORDER — BIOTIN 10 MG
TABLET ORAL
COMMUNITY

## 2024-08-13 ENCOUNTER — OFFICE VISIT (OUTPATIENT)
Dept: OBGYN CLINIC | Facility: CLINIC | Age: 49
End: 2024-08-13
Payer: COMMERCIAL

## 2024-08-13 VITALS
DIASTOLIC BLOOD PRESSURE: 100 MMHG | HEIGHT: 62 IN | TEMPERATURE: 98.4 F | OXYGEN SATURATION: 97 % | SYSTOLIC BLOOD PRESSURE: 140 MMHG | WEIGHT: 191 LBS | BODY MASS INDEX: 35.15 KG/M2 | HEART RATE: 90 BPM

## 2024-08-13 DIAGNOSIS — M75.32 CALCIFIC TENDONITIS OF LEFT SHOULDER: ICD-10-CM

## 2024-08-13 DIAGNOSIS — M25.512 ACUTE PAIN OF LEFT SHOULDER: Primary | ICD-10-CM

## 2024-08-13 DIAGNOSIS — M25.612 SHOULDER STIFFNESS, LEFT: ICD-10-CM

## 2024-08-13 PROCEDURE — 20610 DRAIN/INJ JOINT/BURSA W/O US: CPT | Performed by: STUDENT IN AN ORGANIZED HEALTH CARE EDUCATION/TRAINING PROGRAM

## 2024-08-13 PROCEDURE — 99204 OFFICE O/P NEW MOD 45 MIN: CPT | Performed by: STUDENT IN AN ORGANIZED HEALTH CARE EDUCATION/TRAINING PROGRAM

## 2024-08-13 RX ORDER — BUPIVACAINE HYDROCHLORIDE 2.5 MG/ML
2 INJECTION, SOLUTION INFILTRATION; PERINEURAL
Status: COMPLETED | OUTPATIENT
Start: 2024-08-13 | End: 2024-08-13

## 2024-08-13 RX ORDER — TRIAMCINOLONE ACETONIDE 40 MG/ML
40 INJECTION, SUSPENSION INTRA-ARTICULAR; INTRAMUSCULAR
Status: COMPLETED | OUTPATIENT
Start: 2024-08-13 | End: 2024-08-13

## 2024-08-13 RX ORDER — NAPROXEN 500 MG/1
500 TABLET ORAL EVERY 12 HOURS PRN
Qty: 60 TABLET | Refills: 0 | Status: SHIPPED | OUTPATIENT
Start: 2024-08-13

## 2024-08-13 RX ADMIN — TRIAMCINOLONE ACETONIDE 40 MG: 40 INJECTION, SUSPENSION INTRA-ARTICULAR; INTRAMUSCULAR at 08:30

## 2024-08-13 RX ADMIN — BUPIVACAINE HYDROCHLORIDE 2 ML: 2.5 INJECTION, SOLUTION INFILTRATION; PERINEURAL at 08:30

## 2024-08-13 NOTE — LETTER
August 13, 2024     Patient: Trudi A Kemmerling  YOB: 1975  Date of Visit: 8/13/2024      To Whom it May Concern:    Trudi Kemmerling is under my professional care. Geno was seen in my office on 8/13/2024. Restricted from using left arm at this time until 8/19/2024 when she can return to using left arm as tolerated without restrictions.    If you have any questions or concerns, please don't hesitate to call.         Sincerely,          Kvng Jarrell MD        CC: No Recipients

## 2024-08-13 NOTE — PATIENT INSTRUCTIONS
"Patient Education     Steroid injection   The Basics   Written by the doctors and editors at [facility]   What is a steroid injection? -- Steroids, also known as \"glucocorticoids,\" are medicines that help reduce swelling and pain. Doctors sometimes inject a steroid medicine into a joint or other part of the body to relieve pain. This is also sometimes called a \"cortisone shot.\"  After the injection, the steroid starts to work within a few days.  How long does a steroid injection work? -- It depends on the person and where the injection is given. For some people, the effects of a steroid injection can last for a few weeks or longer.  Sometimes, the doctor also injects a medicine called a \"local anesthetic\" with the steroid. This might help relieve pain until the steroid starts to work.  A steroid injection can help with pain, but it won't cure the problem that is causing the pain.  How do I prepare for a steroid injection? -- The doctor or nurse will tell you if you need to do anything special to prepare. Before your procedure, your doctor will do an exam.  Your doctor will also ask you about your \"health history.\" This involves asking you questions about any health problems you have or had in the past, past surgeries, and any medicines you take. Tell them about:   Any medicines you are taking - This includes any prescription or \"over-the-counter\" medicines you use, plus any herbal supplements you take. It helps to write down and bring a list of any medicines you take, or bring a bag with all of your medicines with you.   Any allergies you have   Any bleeding problems you have   Any other steroid injections you have had  Ask the doctor or nurse if you have questions or if there is anything you do not understand.  How is a steroid injection given? -- When it is time for the injection:   The doctor will clean the skin over the area where they plan to give the shot. (This is called the \"injection site.\")   They might use " ultrasound or a special kind of X-ray during the procedure. This is to check where to give the steroid.   Sometimes, they might give a shot of medicine to numb the skin.   They will inject the steroid.   Then, they will take out the needle and cover the injection site with a bandage.  What happens after a steroid injection? -- You can go home after the injection.  For the next few days, you might want to:   Apply a cold gel pack, bag of ice, or bag of frozen vegetables to the injection site every 1 to 2 hours, for 15 minutes each time. Put a thin towel between the ice (or other cold object) and your skin.   Rest the treated body part for a few days.   Take medicines to relieve pain. Examples include acetaminophen (sample brand name: Tylenol), ibuprofen (sample brand names: Advil, Motrin), or naproxen (sample brand name: Aleve).  If you have diabetes, the doctor might want you to check your blood sugar levels more often for a few days. The steroid medicine might temporarily raise your blood sugar.  What are the risks of a steroid injection? -- Your doctor will talk to you about all of the possible risks, and answer your questions. Possible risks include:   Bleeding, bruising, or soreness at the injection site   Damage to parts near the injection site - This might include cartilage damage, injury to nerves, tendon rupture, or thinning of skin and bones.   Skin around the injection site becoming lighter or whiter in color   Infection   Health conditions like diabetes or high blood pressure getting worse for a few days   Allergic reaction to the medicine  What else should I know? -- Most of the time, doctors limit the total number of steroid injections to a certain area.  A steroid injection might be only 1 part of your treatment plan. Take your other medicines as instructed. Also, follow your doctor's recommendations about other treatments. These might include things like physical therapy or devices like a brace or  cane.  All topics are updated as new evidence becomes available and our peer review process is complete.  This topic retrieved from [employer] on: Apr 25, 2024.  Topic 671232 Version 2.0  Release: 32.4.2 - C32.114  © 2024 [employer] and/or its affiliates. All rights reserved.  Consumer Information Use and Disclaimer   Disclaimer: This generalized information is a limited summary of diagnosis, treatment, and/or medication information. It is not meant to be comprehensive and should be used as a tool to help the user understand and/or assess potential diagnostic and treatment options. It does NOT include all information about conditions, treatments, medications, side effects, or risks that may apply to a specific patient. It is not intended to be medical advice or a substitute for the medical advice, diagnosis, or treatment of a health care provider based on the health care provider's examination and assessment of a patient's specific and unique circumstances. Patients must speak with a health care provider for complete information about their health, medical questions, and treatment options, including any risks or benefits regarding use of medications. This information does not endorse any treatments or medications as safe, effective, or approved for treating a specific patient. [employer] and its affiliates disclaim any warranty or liability relating to this information or the use thereof.The use of this information is governed by the Terms of Use, available at https://www.woltersCogniTensuwer.com/en/know/clinical-effectiveness-terms. 2024© [employer], Inc[employer] and its affiliates and/or licensors. All rights reserved.  Copyright   © 2024 [employer] and/or its affiliates. All rights reserved.

## 2024-08-13 NOTE — PROGRESS NOTES
1. Acute pain of left shoulder  naproxen (NAPROSYN) 500 mg tablet    Large joint arthrocentesis: L subacromial bursa      2. Calcific tendonitis of left shoulder  naproxen (NAPROSYN) 500 mg tablet    Large joint arthrocentesis: L subacromial bursa      3. Shoulder stiffness, left  naproxen (NAPROSYN) 500 mg tablet    Large joint arthrocentesis: L subacromial bursa        Orders Placed This Encounter   Procedures    Large joint arthrocentesis: L subacromial bursa        Imaging Studies (I personally reviewed images in PACS and report):    X-ray left shoulder 8/9/2024: No acute osseous abnormality.  No significant degenerative changes.  2 large foci over superolateral humeral head suspicious for calcific tendinopathy.  These have been noted even from an x-ray in 2022.    IMPRESSION:  Acute atraumatic left shoulder pain/stiffness  Ongoing since 8/7/2024  Calcific tendinopathy appearance of radiographs with 2 large foci  Limited relief from use of sling and NSAIDs    PLAN:    Clinical exam and radiographic imaging reviewed with patient today, with impression as per above. I have discussed with the patient the pathophysiology of this diagnosis and reviewed how the examination correlates with this diagnosis.    Prior imaging of her affected shoulder reviewed today as noted above.  Treatment options were discussed at length, including risks and benefits; after discussing these treatment options, the patient elected for palpation guided left subacromial cortisone injection as per procedure note below.  Counseled we can repeat the injection every 3 to 4 months as needed in regards to pain control.  Counseled patient can continue as needed use of NSAIDs/naproxen, acetaminophen, lidocaine patching , heat/ice therapy while waiting for cortisone to take effect.  I did refill her naproxen 500 mg p.o. twice daily today.  Recommended working her way out of her sling as much as possible to prevent further stiffness of her shoulder.   "I did demonstrate home exercises/stretches to work on to improve her range of motion.  A work accommodations note was provided today as per communications.  Alternatively, if the conservative treatments as per above do not provide relief, patient can be sent for a calcific tendinopathy lavage with interventional radiology as well.  Patient states she may consider this option depending on the response from the procedure today which is reasonable.    Return if symptoms worsen or fail to improve.      Portions of the record may have been created with voice recognition software. Occasional wrong word or \"sound a like\" substitutions may have occurred due to the inherent limitations of voice recognition software. Read the chart carefully and recognize, using context, where substitutions have occurred.     CHIEF COMPLAINT:  Chief Complaint   Patient presents with    Left Shoulder - Pain         HPI:  Trudi A Kemmerling is a 48 y.o. female  who presents for       Visit 8/13/2024:  Initial evaluation of left shoulder pain:  Ongoing issue for approximately 1 week  Atraumatic-the only thing she can recall is that she was taking a trip to [place] and back for work.  She states she was driving for a couple hours longer than she typically does with her arm held up for a prolonged period of time.  She states the pain was over the anterior/lateral/posterior aspect.  She describes a sharp/aching pain of moderate to severe intensity.  She felt that any range of motion of her shoulder would aggravate the pain.  She states despite the pain did not notice any shoulder swelling, discoloration, crepitus.  Denies any N/T of her left upper extremity.  She states she was using ibuprofen 800 mg this only provided mild relief.  She eventually felt the pain was severe enough to go to the ER on 8/9/2024.  ER note was reviewed.  Imaging was done as noted above.  She was prescribed naproxen 500 mg twice daily.  She is here today for follow-up from " "the ER.  She continues to report significant stiffness of her shoulder.  She states naproxen provided mild relief.  She states the sling the pain is slightly more tolerable but does feel it is aggravating her stiffness.  She states she did have a similar type of pain of her right shoulder in the past and when she was told she had calcific tendinopathy.  She states she actually had to undergo surgery for her right shoulder about 20+ years ago.  She states she has intermittent \"flareups\" of her left shoulder in the past but would typically resolve on their its own.  Denies ever undergoing a cortisone injection of her left shoulder.  She is requesting a work note today.  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          Medical, Surgical, Family, and Social History    Past Medical History:   Diagnosis Date    GERD (gastroesophageal reflux disease)     Hypertension      Past Surgical History:   Procedure Laterality Date    TONSILLECTOMY       Social History   Social History     Substance and Sexual Activity   Alcohol Use Not Currently     Social History     Substance and Sexual Activity   Drug Use Never     Social History     Tobacco Use   Smoking Status Never   Smokeless Tobacco Never     History reviewed. No pertinent family history.  No Known Allergies       Physical Exam  /100 (BP Location: Right arm)   Pulse 90   Temp 98.4 °F (36.9 °C)   Ht 5' 2\" (1.575 m)   Wt 86.6 kg (191 lb)   SpO2 97%   BMI 34.93 kg/m²     Constitutional:  see vital signs  Gen: BMI 34.9, normocephalic/atraumatic, well-groomed  Eyes: No inflammation or discharge of conjunctiva or lids; sclera clear   Pulmonary/Chest: Effort normal. No respiratory distress.     Ortho Exam  Shoulder exam:       LEFT    Inspection Erythema None     Swelling None     Increased Warmth None    Rotator cuff ER Deferred due to pain     IR Deferred due to pain     Abduction Deferred due to pain    ROM  Sling was removed for examination.  " Patient is only able to demonstrate small circumduct of motions of her shoulder with generalized shoulder pain and stiffness    TTP:  + Generalized throughout her shoulder    Special Tests: O'Briens  (FF 90, add 10, resist thumbs up-, resist thumbs down+) Unable to test slap    Cross body Adduction Unable to test     Speeds  Negative     Yergason's Negative     Drop arm  unable to test     Neer Unable to test     Sabillon Unable to test        Left  elbow, wrist, and and forearm ROM full, painless with passive ROM, no ttp or crepitance throughout extremities below shoulder joint          Large joint arthrocentesis: L subacromial bursa  [place] Protocol:  procedure performed by consultantConsent: Verbal consent obtained.  Risks and benefits: risks, benefits and alternatives were discussed  Consent given by: patient  Patient understanding: patient states understanding of the procedure being performed  Site marked: the operative site was marked  Radiology Images displayed and confirmed. If images not available, report reviewed: imaging studies available  Patient identity confirmed: verbally with patient  Supporting Documentation  Indications: pain   Procedure Details  Location: shoulder - L subacromial bursa  Preparation: Patient was prepped and draped in the usual sterile fashion  Needle size: 22 G  Ultrasound guidance: no  Approach: posterior  Medications administered: 40 mg triamcinolone acetonide 40 mg/mL; 2 mL bupivacaine 0.25 %    Patient tolerance: patient tolerated the procedure well with no immediate complications  Dressing:  Sterile dressing applied

## 2024-12-18 LAB
CLINICAL INFO: NORMAL
CYTO CVX: NORMAL
CYTOLOGY CMNT CVX/VAG CYTO-IMP: NORMAL
DATE PREVIOUS BX: NORMAL
LMP START DATE: NORMAL
SL AMB PREV. PAP:: NORMAL
SPECIMEN SOURCE CVX/VAG CYTO: NORMAL